# Patient Record
Sex: FEMALE | Race: WHITE | Employment: UNEMPLOYED | URBAN - METROPOLITAN AREA
[De-identification: names, ages, dates, MRNs, and addresses within clinical notes are randomized per-mention and may not be internally consistent; named-entity substitution may affect disease eponyms.]

---

## 2017-09-18 ENCOUNTER — TELEPHONE (OUTPATIENT)
Dept: FAMILY MEDICINE CLINIC | Facility: CLINIC | Age: 52
End: 2017-09-18

## 2017-09-18 DIAGNOSIS — Z12.31 ENCOUNTER FOR SCREENING MAMMOGRAM FOR BREAST CANCER: Primary | ICD-10-CM

## 2017-09-18 NOTE — TELEPHONE ENCOUNTER
Patient would like an order put in for a mammogram prior to her appt on 10/10/17 with Dr. Jarret Mccabe.

## 2017-09-27 ENCOUNTER — TELEPHONE (OUTPATIENT)
Dept: FAMILY MEDICINE CLINIC | Facility: CLINIC | Age: 52
End: 2017-09-27

## 2017-09-27 DIAGNOSIS — E55.9 VITAMIN D DEFICIENCY: ICD-10-CM

## 2017-09-27 DIAGNOSIS — Z00.00 LABORATORY EXAM ORDERED AS PART OF ROUTINE GENERAL MEDICAL EXAMINATION: Primary | ICD-10-CM

## 2017-09-27 DIAGNOSIS — E03.9 ACQUIRED HYPOTHYROIDISM: ICD-10-CM

## 2017-10-02 ENCOUNTER — HOSPITAL ENCOUNTER (OUTPATIENT)
Dept: MAMMOGRAPHY | Age: 52
Discharge: HOME | End: 2017-10-02
Attending: FAMILY MEDICINE
Payer: COMMERCIAL

## 2017-10-02 DIAGNOSIS — Z12.31 ENCOUNTER FOR SCREENING MAMMOGRAM FOR BREAST CANCER: ICD-10-CM

## 2017-10-02 PROCEDURE — 77067 SCR MAMMO BI INCL CAD: CPT | Performed by: FAMILY MEDICINE

## 2017-10-03 DIAGNOSIS — R92.8 ABNORMAL MAMMOGRAM OF LEFT BREAST: Primary | ICD-10-CM

## 2017-10-10 ENCOUNTER — OFFICE VISIT (OUTPATIENT)
Dept: FAMILY MEDICINE CLINIC | Facility: CLINIC | Age: 52
End: 2017-10-10

## 2017-10-10 VITALS
TEMPERATURE: 98 F | RESPIRATION RATE: 16 BRPM | HEART RATE: 60 BPM | HEIGHT: 66.8 IN | SYSTOLIC BLOOD PRESSURE: 100 MMHG | BODY MASS INDEX: 26.21 KG/M2 | WEIGHT: 167 LBS | DIASTOLIC BLOOD PRESSURE: 60 MMHG

## 2017-10-10 DIAGNOSIS — Z11.1 SCREENING EXAMINATION FOR PULMONARY TUBERCULOSIS: ICD-10-CM

## 2017-10-10 DIAGNOSIS — Z00.00 ROUTINE GENERAL MEDICAL EXAMINATION AT A HEALTH CARE FACILITY: Primary | ICD-10-CM

## 2017-10-10 PROCEDURE — 90471 IMMUNIZATION ADMIN: CPT | Performed by: FAMILY MEDICINE

## 2017-10-10 PROCEDURE — 86580 TB INTRADERMAL TEST: CPT | Performed by: FAMILY MEDICINE

## 2017-10-10 PROCEDURE — 90686 IIV4 VACC NO PRSV 0.5 ML IM: CPT | Performed by: FAMILY MEDICINE

## 2017-10-10 PROCEDURE — 99396 PREV VISIT EST AGE 40-64: CPT | Performed by: FAMILY MEDICINE

## 2017-10-10 RX ORDER — LEVOTHYROXINE SODIUM 100 UG/1
100 TABLET ORAL
Refills: 2 | COMMUNITY
Start: 2017-10-07 | End: 2020-07-31 | Stop reason: DRUGHIGH

## 2017-10-10 RX ORDER — POLYETHYLENE GLYCOL 3350, SODIUM CHLORIDE, POTASSIUM CHLORIDE, SODIUM BICARBONATE, AND SODIUM SULFATE 240; 5.84; 2.98; 6.72; 22.72 G/4L; G/4L; G/4L; G/4L; G/4L
POWDER, FOR SOLUTION ORAL
Refills: 0 | COMMUNITY
Start: 2017-08-25 | End: 2018-09-17 | Stop reason: ALTCHOICE

## 2017-10-10 NOTE — PROGRESS NOTES
HPI:   Lizzie Sullivan is a 50year old female who presents for a complete physical exam.  Last pap:  UTD per pt, seeing GYN  Last mammogram:  Having additional images next week  LMP:  Periods regular  Previous colonoscopy:  Scheduled in Dec  Previous Alcohol use: Yes           0.0 oz/week     Comment: 3 drinks a month    : yes. Children: son and daughter.      REVIEW OF SYSTEMS:   GENERAL: feels well otherwise  SKIN: denies any unusual skin lesions  EYES:no vision problems  LUNGS: denies shortn encounter     Imaging & Consults:  FLULAVAL INFLUENZA VACCINE QUAD PRESERVATIVE FREE 0.5 ML

## 2017-10-12 ENCOUNTER — NURSE ONLY (OUTPATIENT)
Dept: FAMILY MEDICINE CLINIC | Facility: CLINIC | Age: 52
End: 2017-10-12

## 2017-10-12 NOTE — PROGRESS NOTES
Pt came in for tb read. Pt here within testing protocol of 48-72 hours. No redness or swelling at test sit. Induration measured 0.0 mm.  NO induration, pt negative for tb

## 2017-10-17 ENCOUNTER — HOSPITAL ENCOUNTER (OUTPATIENT)
Dept: MAMMOGRAPHY | Facility: HOSPITAL | Age: 52
Discharge: HOME | End: 2017-10-17
Attending: FAMILY MEDICINE
Payer: COMMERCIAL

## 2017-10-17 DIAGNOSIS — R92.8 ABNORMAL MAMMOGRAM OF LEFT BREAST: ICD-10-CM

## 2017-10-17 PROCEDURE — 76642 ULTRASOUND BREAST LIMITED: CPT | Performed by: FAMILY MEDICINE

## 2017-10-17 PROCEDURE — 77065 DX MAMMO INCL CAD UNI: CPT | Performed by: FAMILY MEDICINE

## 2018-04-20 ENCOUNTER — TELEPHONE (OUTPATIENT)
Dept: FAMILY MEDICINE CLINIC | Facility: CLINIC | Age: 53
End: 2018-04-20

## 2018-04-20 DIAGNOSIS — R92.8 ABNORMAL MAMMOGRAM OF LEFT BREAST: Primary | ICD-10-CM

## 2018-04-20 NOTE — TELEPHONE ENCOUNTER
LOV 10/10/17. No future appointments. Last mammogram was done 10/17/17 and at that time, pt was advised to get follow up left diagnostic mammogram in 6 months-order pended. Routed to Dr Gt Saeed.

## 2018-05-04 ENCOUNTER — HOSPITAL ENCOUNTER (OUTPATIENT)
Dept: MAMMOGRAPHY | Facility: HOSPITAL | Age: 53
Discharge: HOME | End: 2018-05-04
Attending: FAMILY MEDICINE
Payer: COMMERCIAL

## 2018-05-04 DIAGNOSIS — R92.8 ABNORMAL MAMMOGRAM OF LEFT BREAST: ICD-10-CM

## 2018-05-04 PROCEDURE — 77065 DX MAMMO INCL CAD UNI: CPT | Performed by: FAMILY MEDICINE

## 2018-05-04 PROCEDURE — 76642 ULTRASOUND BREAST LIMITED: CPT | Performed by: FAMILY MEDICINE

## 2018-05-04 PROCEDURE — 77061 BREAST TOMOSYNTHESIS UNI: CPT | Performed by: FAMILY MEDICINE

## 2018-05-07 ENCOUNTER — TELEPHONE (OUTPATIENT)
Dept: FAMILY MEDICINE CLINIC | Facility: CLINIC | Age: 53
End: 2018-05-07

## 2018-05-07 DIAGNOSIS — R92.8 ABNORMAL MAMMOGRAM: Primary | ICD-10-CM

## 2018-05-07 NOTE — TELEPHONE ENCOUNTER
Notes recorded by Jaylen Santiago MD on 5/4/2018 at 4:37 PM CDT  Mammogram appears benign, however, bilateral six-month diagnostic mammogram is recommended.  Diagnosis abnormal mammogram.  Please order      Notes recorded by Isaiah Sanders RN on 5/7/2018 a

## 2018-09-17 ENCOUNTER — OFFICE VISIT (OUTPATIENT)
Dept: FAMILY MEDICINE CLINIC | Facility: CLINIC | Age: 53
End: 2018-09-17
Payer: COMMERCIAL

## 2018-09-17 VITALS
WEIGHT: 163 LBS | TEMPERATURE: 98 F | SYSTOLIC BLOOD PRESSURE: 100 MMHG | DIASTOLIC BLOOD PRESSURE: 60 MMHG | HEIGHT: 67.25 IN | HEART RATE: 60 BPM | RESPIRATION RATE: 12 BRPM | BODY MASS INDEX: 25.29 KG/M2

## 2018-09-17 DIAGNOSIS — T63.481A INSECT STINGS, ACCIDENTAL OR UNINTENTIONAL, INITIAL ENCOUNTER: Primary | ICD-10-CM

## 2018-09-17 PROCEDURE — 99213 OFFICE O/P EST LOW 20 MIN: CPT | Performed by: NURSE PRACTITIONER

## 2018-09-17 RX ORDER — PREDNISONE 20 MG/1
20 TABLET ORAL DAILY
Qty: 4 TABLET | Refills: 0 | Status: SHIPPED | OUTPATIENT
Start: 2018-09-17 | End: 2018-09-21

## 2018-09-17 NOTE — PATIENT INSTRUCTIONS
Take prednisone as ordered     Take over the counter allergy medicine such as Claritin, Allegra, Xyzal, Zytrec (or generic equivalent). Daily for 1 week. Apply warm compresses to area 2-3 times daily for 10-15 minutes.      If you

## 2018-10-24 ENCOUNTER — TELEPHONE (OUTPATIENT)
Dept: FAMILY MEDICINE CLINIC | Facility: CLINIC | Age: 53
End: 2018-10-24

## 2018-10-24 NOTE — TELEPHONE ENCOUNTER
Pt calling she is suppose to get her mammogram every 6 months and she is due and  she needs a order put in for one.

## 2018-11-02 ENCOUNTER — TELEPHONE (OUTPATIENT)
Dept: FAMILY MEDICINE CLINIC | Facility: CLINIC | Age: 53
End: 2018-11-02

## 2018-11-02 DIAGNOSIS — R92.8 ABNORMAL MAMMOGRAM OF BOTH BREASTS: Primary | ICD-10-CM

## 2018-11-02 NOTE — TELEPHONE ENCOUNTER
Becca Locks with [facility] calling to get an order for a bilateral ultrasound.  Patient is scheduled next week for mammogram

## 2018-11-06 ENCOUNTER — HOSPITAL ENCOUNTER (OUTPATIENT)
Dept: MAMMOGRAPHY | Facility: HOSPITAL | Age: 53
Discharge: HOME | End: 2018-11-06
Attending: FAMILY MEDICINE
Payer: COMMERCIAL

## 2018-11-06 DIAGNOSIS — R92.8 ABNORMAL MAMMOGRAM: ICD-10-CM

## 2018-11-06 PROCEDURE — 77066 DX MAMMO INCL CAD BI: CPT | Performed by: FAMILY MEDICINE

## 2018-11-06 PROCEDURE — 77062 BREAST TOMOSYNTHESIS BI: CPT | Performed by: FAMILY MEDICINE

## 2019-10-01 ENCOUNTER — TELEPHONE (OUTPATIENT)
Dept: FAMILY MEDICINE CLINIC | Facility: CLINIC | Age: 54
End: 2019-10-01

## 2019-10-01 DIAGNOSIS — Z12.31 VISIT FOR SCREENING MAMMOGRAM: Primary | ICD-10-CM

## 2019-10-01 DIAGNOSIS — E55.9 VITAMIN D DEFICIENCY: ICD-10-CM

## 2019-10-01 DIAGNOSIS — Z01.419 WELL WOMAN EXAM: ICD-10-CM

## 2019-10-01 DIAGNOSIS — E03.9 ACQUIRED HYPOTHYROIDISM: ICD-10-CM

## 2019-10-01 NOTE — TELEPHONE ENCOUNTER
Please enter lab orders for the patient's upcoming physical appointment. Please order mammogram and route back to . Physical scheduled:    Your appointments     Date & Time Appointment Department [facility])    Oct 08, 2019  2:30 PM CDT Physical

## 2019-10-08 ENCOUNTER — OFFICE VISIT (OUTPATIENT)
Dept: FAMILY MEDICINE CLINIC | Facility: CLINIC | Age: 54
End: 2019-10-08
Payer: COMMERCIAL

## 2019-10-08 VITALS
BODY MASS INDEX: 24.64 KG/M2 | RESPIRATION RATE: 16 BRPM | HEIGHT: 67 IN | HEART RATE: 60 BPM | WEIGHT: 157 LBS | DIASTOLIC BLOOD PRESSURE: 70 MMHG | TEMPERATURE: 98 F | SYSTOLIC BLOOD PRESSURE: 120 MMHG

## 2019-10-08 DIAGNOSIS — Z01.419 WELL WOMAN EXAM WITH ROUTINE GYNECOLOGICAL EXAM: Primary | ICD-10-CM

## 2019-10-08 DIAGNOSIS — Z12.4 SCREENING FOR CERVICAL CANCER: ICD-10-CM

## 2019-10-08 PROCEDURE — 99396 PREV VISIT EST AGE 40-64: CPT | Performed by: FAMILY MEDICINE

## 2019-10-08 PROCEDURE — 90686 IIV4 VACC NO PRSV 0.5 ML IM: CPT | Performed by: FAMILY MEDICINE

## 2019-10-08 PROCEDURE — 90471 IMMUNIZATION ADMIN: CPT | Performed by: FAMILY MEDICINE

## 2019-10-08 NOTE — PROGRESS NOTES
SUBJECTIVE:  Patient presents with:  Physical: WWE w pap  Imm/Inj: Flu Shot    HPI:  No concerns  Sees endocrinology for management of thyroid. Health Maintenance:  Vaccines: reviewed as below.  Indicated today: Influenza, shingrix    Immunization Histor Smokeless tobacco: Never Used    Alcohol use:  Yes      Alcohol/week: 0.0 standard drinks      Comment: 3 drinks a month    Drug use: No       Allergies:  No Known Allergies    OBJECTIVE:  PHYSICAL EXAM:   10/08/19  1415   BP: 120/70   Pulse: 60   Resp: 16 PRESERVATIVE FREE 0.5 ML  Obesity screening: Body mass index is 24.59 kg/m².  Discussed healthy diet and exercise in detail today  Hypertension screening: normotensive  Depression screening: negative  Cervical Cancer Screening:   -     THINPREP TIS AND HPV

## 2019-10-14 ENCOUNTER — TELEPHONE (OUTPATIENT)
Dept: FAMILY MEDICINE CLINIC | Facility: CLINIC | Age: 54
End: 2019-10-14

## 2019-11-12 ENCOUNTER — HOSPITAL ENCOUNTER (OUTPATIENT)
Dept: MAMMOGRAPHY | Facility: HOSPITAL | Age: 54
Discharge: HOME | End: 2019-11-12
Attending: FAMILY MEDICINE
Payer: COMMERCIAL

## 2019-11-12 DIAGNOSIS — Z12.31 VISIT FOR SCREENING MAMMOGRAM: ICD-10-CM

## 2019-11-12 PROCEDURE — 77063 BREAST TOMOSYNTHESIS BI: CPT | Performed by: FAMILY MEDICINE

## 2019-11-12 PROCEDURE — 77067 SCR MAMMO BI INCL CAD: CPT | Performed by: FAMILY MEDICINE

## 2019-11-25 ENCOUNTER — HOSPITAL ENCOUNTER (OUTPATIENT)
Age: 54
Discharge: HOME | End: 2019-11-25
Attending: FAMILY MEDICINE
Payer: COMMERCIAL

## 2019-11-25 VITALS
HEART RATE: 84 BPM | BODY MASS INDEX: 23.54 KG/M2 | SYSTOLIC BLOOD PRESSURE: 96 MMHG | DIASTOLIC BLOOD PRESSURE: 74 MMHG | OXYGEN SATURATION: 100 % | RESPIRATION RATE: 18 BRPM | TEMPERATURE: 98 F | WEIGHT: 150 LBS | HEIGHT: 67 IN

## 2019-11-25 DIAGNOSIS — J02.9 ACUTE PHARYNGITIS, UNSPECIFIED ETIOLOGY: Primary | ICD-10-CM

## 2019-11-25 PROCEDURE — 99214 OFFICE O/P EST MOD 30 MIN: CPT

## 2019-11-25 PROCEDURE — 87430 STREP A AG IA: CPT | Performed by: FAMILY MEDICINE

## 2019-11-25 PROCEDURE — 87430 STREP A AG IA: CPT

## 2019-11-25 PROCEDURE — 87081 CULTURE SCREEN ONLY: CPT | Performed by: FAMILY MEDICINE

## 2019-11-25 PROCEDURE — 99204 OFFICE O/P NEW MOD 45 MIN: CPT

## 2019-11-25 RX ORDER — PREDNISONE 20 MG/1
20 TABLET ORAL 2 TIMES DAILY
Qty: 10 TABLET | Refills: 0 | Status: SHIPPED | OUTPATIENT
Start: 2019-11-25 | End: 2019-11-30

## 2019-11-26 NOTE — ED PROVIDER NOTES
Patient presents with:  Sore Throat      HPI:     Maryam Rivera is a 48year old female who presents for evaluation of a chief complaint of sore throat. Associated symptoms include chills . Onset of symptoms was abrupt starting 3 days ago.  Symptoms h If has worsening symptoms, drooling from mouth and unable to swallow go to ER         All results reviewed and discussed with patient. See AVS for detailed discharge instructions for your condition today.     Follow Up with:  Humberto Sheppard MD  [address] [facility]

## 2020-02-28 ENCOUNTER — TELEPHONE (OUTPATIENT)
Dept: FAMILY MEDICINE CLINIC | Facility: CLINIC | Age: 55
End: 2020-02-28

## 2020-03-24 ENCOUNTER — TELEPHONE (OUTPATIENT)
Dept: FAMILY MEDICINE CLINIC | Facility: CLINIC | Age: 55
End: 2020-03-24

## 2020-03-24 DIAGNOSIS — H10.31 ACUTE CONJUNCTIVITIS OF RIGHT EYE, UNSPECIFIED ACUTE CONJUNCTIVITIS TYPE: Primary | ICD-10-CM

## 2020-03-24 PROCEDURE — 99441 PHONE E/M BY PHYS 5-10 MIN: CPT | Performed by: FAMILY MEDICINE

## 2020-03-24 RX ORDER — POLYMYXIN B SULFATE AND TRIMETHOPRIM 1; 10000 MG/ML; [USP'U]/ML
2 SOLUTION OPHTHALMIC EVERY 4 HOURS
Qty: 10 ML | Refills: 1 | Status: SHIPPED | OUTPATIENT
Start: 2020-03-24

## 2020-03-24 NOTE — TELEPHONE ENCOUNTER
Virtual/Telephone Check-In    Anika Flores verbally consents to a [employer] on 3/24/2020.   Patient understands and accepts financial responsibility for any deductible, co-insurance and/or co-pays associated with this service

## 2020-03-24 NOTE — TELEPHONE ENCOUNTER
C/o eye trouble for several days some discharge but feels burning feeling to right eye some redness, no other symptoms

## 2020-03-24 NOTE — TELEPHONE ENCOUNTER
Right eye has discharge itching x 1 days  No pain  No vision change  Crusting in AM  No contacts  No cough  No fever    1. Acute conjunctivitis of right eye, unspecified acute conjunctivitis type  abx drops as directed.   Contagious, dont touch eyes, wash h

## 2020-07-30 ENCOUNTER — TELEPHONE (OUTPATIENT)
Dept: FAMILY MEDICINE CLINIC | Facility: CLINIC | Age: 55
End: 2020-07-30

## 2020-07-30 DIAGNOSIS — Z23 NEED FOR SHINGLES VACCINE: Primary | ICD-10-CM

## 2020-07-30 NOTE — TELEPHONE ENCOUNTER
Pt is scheduled for 1st shingrix 8/7 please place orders. Pt aware she's due for her physical in October and will schedule later with Dr. Kelsey Guy.

## 2020-07-31 RX ORDER — LEVOTHYROXINE SODIUM 112 UG/1
TABLET ORAL
COMMUNITY
Start: 2020-07-13 | End: 2020-12-16 | Stop reason: DRUGHIGH

## 2020-08-07 ENCOUNTER — NURSE ONLY (OUTPATIENT)
Dept: FAMILY MEDICINE CLINIC | Facility: CLINIC | Age: 55
End: 2020-08-07
Payer: COMMERCIAL

## 2020-08-07 VITALS — TEMPERATURE: 98 F

## 2020-08-07 DIAGNOSIS — Z23 NEED FOR SHINGLES VACCINE: ICD-10-CM

## 2020-08-07 PROCEDURE — 90750 HZV VACC RECOMBINANT IM: CPT | Performed by: FAMILY MEDICINE

## 2020-08-07 PROCEDURE — 90471 IMMUNIZATION ADMIN: CPT | Performed by: FAMILY MEDICINE

## 2020-08-07 NOTE — PROGRESS NOTES
Patient presents for her shingles vaccine done one. The Shingrix was ordered by Dr. Nico Camejo M.D. The patient is given the VIS and has signed the waiver. The Shingrix is administered into the left deltoid.     The patient is reminded to return in two to s

## 2020-11-04 ENCOUNTER — TELEPHONE (OUTPATIENT)
Dept: FAMILY MEDICINE CLINIC | Facility: CLINIC | Age: 55
End: 2020-11-04

## 2020-11-04 DIAGNOSIS — Z12.31 BREAST CANCER SCREENING BY MAMMOGRAM: Primary | ICD-10-CM

## 2020-11-04 DIAGNOSIS — Z23 NEED FOR SHINGLES VACCINE: Primary | ICD-10-CM

## 2020-11-05 NOTE — TELEPHONE ENCOUNTER
Shigrix pending for Nurse visit for tomorrow  Forward to Dr Kwabena Winters who complete physical on 10/14/2019

## 2020-11-06 ENCOUNTER — NURSE ONLY (OUTPATIENT)
Dept: FAMILY MEDICINE CLINIC | Facility: CLINIC | Age: 55
End: 2020-11-06
Payer: COMMERCIAL

## 2020-11-06 VITALS — TEMPERATURE: 98 F

## 2020-11-06 DIAGNOSIS — Z23 NEED FOR SHINGLES VACCINE: ICD-10-CM

## 2020-11-06 PROCEDURE — 90750 HZV VACC RECOMBINANT IM: CPT | Performed by: FAMILY MEDICINE

## 2020-11-06 PROCEDURE — 90471 IMMUNIZATION ADMIN: CPT | Performed by: FAMILY MEDICINE

## 2020-11-06 NOTE — PROGRESS NOTES
Patient presents for her second dose of Shingrix. She has no complaints. She is given the VIS   She has signed the waiver   The Shingrix was ordered by Dr. Virginia Piña M.D. The Shingrix is administered into the left deltoid.    After sitting for a few minute

## 2020-12-10 PROBLEM — E06.3 HASHIMOTO'S THYROIDITIS: Status: ACTIVE | Noted: 2020-12-10

## 2021-04-27 ENCOUNTER — HOSPITAL ENCOUNTER (OUTPATIENT)
Dept: MAMMOGRAPHY | Facility: HOSPITAL | Age: 56
Discharge: HOME | End: 2021-04-27
Attending: FAMILY MEDICINE
Payer: COMMERCIAL

## 2021-04-27 DIAGNOSIS — Z12.31 BREAST CANCER SCREENING BY MAMMOGRAM: ICD-10-CM

## 2021-04-27 PROCEDURE — 77067 SCR MAMMO BI INCL CAD: CPT | Performed by: FAMILY MEDICINE

## 2021-04-27 PROCEDURE — 77063 BREAST TOMOSYNTHESIS BI: CPT | Performed by: FAMILY MEDICINE

## 2021-04-29 ENCOUNTER — HOSPITAL ENCOUNTER (OUTPATIENT)
Dept: MAMMOGRAPHY | Facility: HOSPITAL | Age: 56
Discharge: HOME | End: 2021-04-29
Attending: FAMILY MEDICINE
Payer: COMMERCIAL

## 2021-04-29 DIAGNOSIS — R92.2 INCONCLUSIVE MAMMOGRAM: ICD-10-CM

## 2021-04-29 PROCEDURE — 77065 DX MAMMO INCL CAD UNI: CPT | Performed by: FAMILY MEDICINE

## 2021-04-29 PROCEDURE — 77061 BREAST TOMOSYNTHESIS UNI: CPT | Performed by: FAMILY MEDICINE

## 2021-12-07 PROBLEM — G57.61 MORTON METATARSALGIA, RIGHT: Status: ACTIVE | Noted: 2021-12-07

## 2022-01-26 ENCOUNTER — OFFICE VISIT (OUTPATIENT)
Dept: FAMILY MEDICINE CLINIC | Facility: CLINIC | Age: 57
End: 2022-01-26
Payer: COMMERCIAL

## 2022-01-26 VITALS
SYSTOLIC BLOOD PRESSURE: 122 MMHG | RESPIRATION RATE: 16 BRPM | DIASTOLIC BLOOD PRESSURE: 68 MMHG | OXYGEN SATURATION: 99 % | TEMPERATURE: 97 F | BODY MASS INDEX: 25.11 KG/M2 | HEIGHT: 67 IN | WEIGHT: 160 LBS | HEART RATE: 96 BPM

## 2022-01-26 DIAGNOSIS — R30.0 DYSURIA: Primary | ICD-10-CM

## 2022-01-26 LAB
BILIRUBIN: NEGATIVE
GLUCOSE (URINE DIPSTICK): NEGATIVE MG/DL
KETONES (URINE DIPSTICK): NEGATIVE MG/DL
MULTISTIX LOT#: ABNORMAL NUMERIC
NITRITE, URINE: NEGATIVE
PH, URINE: 5.5 (ref 4.5–8)
PROTEIN (URINE DIPSTICK): NEGATIVE MG/DL
SPECIFIC GRAVITY: 1.03 (ref 1–1.03)
URINE-COLOR: YELLOW
UROBILINOGEN,SEMI-QN: 0.2 MG/DL (ref 0–1.9)

## 2022-01-26 PROCEDURE — 3078F DIAST BP <80 MM HG: CPT | Performed by: NURSE PRACTITIONER

## 2022-01-26 PROCEDURE — 99213 OFFICE O/P EST LOW 20 MIN: CPT | Performed by: NURSE PRACTITIONER

## 2022-01-26 PROCEDURE — 81003 URINALYSIS AUTO W/O SCOPE: CPT | Performed by: NURSE PRACTITIONER

## 2022-01-26 PROCEDURE — 3074F SYST BP LT 130 MM HG: CPT | Performed by: NURSE PRACTITIONER

## 2022-01-26 PROCEDURE — 3008F BODY MASS INDEX DOCD: CPT | Performed by: NURSE PRACTITIONER

## 2022-01-26 RX ORDER — NITROFURANTOIN 25; 75 MG/1; MG/1
100 CAPSULE ORAL 2 TIMES DAILY
Qty: 14 CAPSULE | Refills: 0 | Status: SHIPPED | OUTPATIENT
Start: 2022-01-26

## 2022-01-26 NOTE — PROGRESS NOTES
Patient presents with:  Urinary: bladdder infection for about a week       HPI:  Presents with approx 1 week history of dysuria, waxing and waning in intensity. Denies fever/chills, urgency, frequency, hematuria, abd/flank pain, N/V/D.  Has not been treatin culture and start treatment with nitrofurantoin today. Medication administration, use and side effects discussed. Instructed to notify office if not improved in 3-4 days or if symptoms worsen.  Verbalized understanding of instructions and agreeable to this

## 2022-01-31 ENCOUNTER — TELEPHONE (OUTPATIENT)
Dept: FAMILY MEDICINE CLINIC | Facility: CLINIC | Age: 57
End: 2022-01-31

## 2022-01-31 NOTE — TELEPHONE ENCOUNTER
Patient states she received her urine results last week. Urine negative for infection. This morning when urinating there was a \"stone looking thing\" when she wiped. Looked like \"half a kidney stone\".    Yesterday and this morning prior to urinating sh

## 2022-02-08 ENCOUNTER — TELEPHONE (OUTPATIENT)
Dept: FAMILY MEDICINE CLINIC | Facility: CLINIC | Age: 57
End: 2022-02-08

## 2022-05-10 NOTE — TELEPHONE ENCOUNTER
Called and spoke with patient. Relayed provider recommendation, per Vanessa Chicas CNP: I think she should be seen this week for another assessment.  Are their same day appointments available this week?     Patient requesting appointment on Thursday if possible. Scheduled for 5/12/22 OV w/ Carlee Chris CNP. Advised if any new/worsening symptoms before that time, to contact clinic. Patient agreed with plan.     Avi BETTENCOURT RN       LOV physical completed by Dr Emily Reyes who is out of the office for an extended time  Physical completed prior was with Dr Marisel Bullock (PCP)

## 2022-06-20 ENCOUNTER — TELEPHONE (OUTPATIENT)
Dept: FAMILY MEDICINE CLINIC | Facility: CLINIC | Age: 57
End: 2022-06-20

## 2022-06-20 DIAGNOSIS — Z12.31 ENCOUNTER FOR SCREENING MAMMOGRAM FOR BREAST CANCER: Primary | ICD-10-CM

## 2022-07-01 ENCOUNTER — HOSPITAL ENCOUNTER (EMERGENCY)
Facility: HOSPITAL | Age: 57
Discharge: HOME | End: 2022-07-01
Attending: EMERGENCY MEDICINE
Payer: COMMERCIAL

## 2022-07-01 ENCOUNTER — APPOINTMENT (OUTPATIENT)
Dept: GENERAL RADIOLOGY | Facility: HOSPITAL | Age: 57
End: 2022-07-01
Payer: COMMERCIAL

## 2022-07-01 VITALS
OXYGEN SATURATION: 98 % | RESPIRATION RATE: 18 BRPM | WEIGHT: 158.75 LBS | HEART RATE: 74 BPM | BODY MASS INDEX: 25 KG/M2 | TEMPERATURE: 98 F | SYSTOLIC BLOOD PRESSURE: 124 MMHG | DIASTOLIC BLOOD PRESSURE: 60 MMHG

## 2022-07-01 DIAGNOSIS — R07.9 CHEST PAIN OF UNCERTAIN ETIOLOGY: Primary | ICD-10-CM

## 2022-07-01 LAB
ALBUMIN SERPL-MCNC: 3.5 G/DL (ref 3.4–5)
ALBUMIN/GLOB SERPL: 1 {RATIO} (ref 1–2)
ALP LIVER SERPL-CCNC: 77 U/L
ALT SERPL-CCNC: 18 U/L
ANION GAP SERPL CALC-SCNC: 2 MMOL/L (ref 0–18)
AST SERPL-CCNC: 20 U/L (ref 15–37)
ATRIAL RATE: 69 BPM
BASOPHILS # BLD AUTO: 0.02 X10(3) UL (ref 0–0.2)
BASOPHILS NFR BLD AUTO: 0.4 %
BILIRUB SERPL-MCNC: 0.4 MG/DL (ref 0.1–2)
BUN BLD-MCNC: 13 MG/DL (ref 7–18)
CALCIUM BLD-MCNC: 8.9 MG/DL (ref 8.5–10.1)
CHLORIDE SERPL-SCNC: 110 MMOL/L (ref 98–112)
CO2 SERPL-SCNC: 28 MMOL/L (ref 21–32)
CREAT BLD-MCNC: 0.82 MG/DL
D DIMER PPP FEU-MCNC: 0.36 UG/ML FEU (ref ?–0.55)
EOSINOPHIL # BLD AUTO: 0.06 X10(3) UL (ref 0–0.7)
EOSINOPHIL NFR BLD AUTO: 1.1 %
ERYTHROCYTE [DISTWIDTH] IN BLOOD BY AUTOMATED COUNT: 13.3 %
GLOBULIN PLAS-MCNC: 3.5 G/DL (ref 2.8–4.4)
GLUCOSE BLD-MCNC: 82 MG/DL (ref 70–99)
HCT VFR BLD AUTO: 38.3 %
HGB BLD-MCNC: 12.8 G/DL
IMM GRANULOCYTES # BLD AUTO: 0.02 X10(3) UL (ref 0–1)
IMM GRANULOCYTES NFR BLD: 0.4 %
LYMPHOCYTES # BLD AUTO: 1.55 X10(3) UL (ref 1–4)
LYMPHOCYTES NFR BLD AUTO: 28.8 %
MCH RBC QN AUTO: 30.3 PG (ref 26–34)
MCHC RBC AUTO-ENTMCNC: 33.4 G/DL (ref 31–37)
MCV RBC AUTO: 90.5 FL
MONOCYTES # BLD AUTO: 0.32 X10(3) UL (ref 0.1–1)
MONOCYTES NFR BLD AUTO: 5.9 %
NEUTROPHILS # BLD AUTO: 3.41 X10 (3) UL (ref 1.5–7.7)
NEUTROPHILS # BLD AUTO: 3.41 X10(3) UL (ref 1.5–7.7)
NEUTROPHILS NFR BLD AUTO: 63.4 %
OSMOLALITY SERPL CALC.SUM OF ELEC: 289 MOSM/KG (ref 275–295)
P AXIS: 53 DEGREES
P-R INTERVAL: 140 MS
PLATELET # BLD AUTO: 221 10(3)UL (ref 150–450)
POTASSIUM SERPL-SCNC: 4 MMOL/L (ref 3.5–5.1)
PROT SERPL-MCNC: 7 G/DL (ref 6.4–8.2)
Q-T INTERVAL: 408 MS
QRS DURATION: 94 MS
QTC CALCULATION (BEZET): 437 MS
R AXIS: 41 DEGREES
RBC # BLD AUTO: 4.23 X10(6)UL
SODIUM SERPL-SCNC: 140 MMOL/L (ref 136–145)
T AXIS: 40 DEGREES
T4 FREE SERPL-MCNC: 1 NG/DL (ref 0.8–1.7)
TROPONIN I HIGH SENSITIVITY: 10 NG/L
VENTRICULAR RATE: 69 BPM
WBC # BLD AUTO: 5.4 X10(3) UL (ref 4–11)

## 2022-07-01 PROCEDURE — 85379 FIBRIN DEGRADATION QUANT: CPT | Performed by: EMERGENCY MEDICINE

## 2022-07-01 PROCEDURE — 93010 ELECTROCARDIOGRAM REPORT: CPT

## 2022-07-01 PROCEDURE — 36415 COLL VENOUS BLD VENIPUNCTURE: CPT

## 2022-07-01 PROCEDURE — 84439 ASSAY OF FREE THYROXINE: CPT | Performed by: EMERGENCY MEDICINE

## 2022-07-01 PROCEDURE — 99284 EMERGENCY DEPT VISIT MOD MDM: CPT

## 2022-07-01 PROCEDURE — 84484 ASSAY OF TROPONIN QUANT: CPT | Performed by: EMERGENCY MEDICINE

## 2022-07-01 PROCEDURE — 80053 COMPREHEN METABOLIC PANEL: CPT | Performed by: EMERGENCY MEDICINE

## 2022-07-01 PROCEDURE — 85025 COMPLETE CBC W/AUTO DIFF WBC: CPT | Performed by: EMERGENCY MEDICINE

## 2022-07-01 PROCEDURE — 71045 X-RAY EXAM CHEST 1 VIEW: CPT | Performed by: EMERGENCY MEDICINE

## 2022-07-01 PROCEDURE — 93005 ELECTROCARDIOGRAM TRACING: CPT

## 2022-07-01 NOTE — ED INITIAL ASSESSMENT (HPI)
Pt to the emergency room with chest pain since 0930 this morning. No sob, no dizziness no weakness. Pt states that while at work she noted sudden sharp pains similar to a jolt that would happen occasionally. Pt denies previous hx of this. No previous cardiac or respiratory hx. No medications taken pta. NO other concern at this time. Pt does not appear to be in distress at this time.

## 2022-07-23 ENCOUNTER — HOSPITAL ENCOUNTER (OUTPATIENT)
Dept: MAMMOGRAPHY | Age: 57
Discharge: HOME | End: 2022-07-23
Attending: FAMILY MEDICINE
Payer: COMMERCIAL

## 2022-07-23 DIAGNOSIS — Z12.31 ENCOUNTER FOR SCREENING MAMMOGRAM FOR BREAST CANCER: ICD-10-CM

## 2022-07-23 PROCEDURE — 77063 BREAST TOMOSYNTHESIS BI: CPT | Performed by: FAMILY MEDICINE

## 2022-07-23 PROCEDURE — 77067 SCR MAMMO BI INCL CAD: CPT | Performed by: FAMILY MEDICINE

## 2022-09-29 ENCOUNTER — TELEPHONE (OUTPATIENT)
Dept: FAMILY MEDICINE CLINIC | Facility: CLINIC | Age: 57
End: 2022-09-29

## 2022-09-29 NOTE — TELEPHONE ENCOUNTER
Last mammogram 7/23/2022    Routed to Dr. Viri Bourgeois - any need for breast ultrasound d/t dense breasts?

## 2022-09-29 NOTE — TELEPHONE ENCOUNTER
TC from patient who has been hearing a lot lately on the news about dense breasts and need for ultrasound. Patient's last ultrasound once again stated dense breast tissue and wondering if she should get a breast ultrasound.    Patient can be reached at [phone]

## 2022-09-30 ENCOUNTER — TELEPHONE (OUTPATIENT)
Dept: FAMILY MEDICINE CLINIC | Facility: CLINIC | Age: 57
End: 2022-09-30

## 2022-09-30 DIAGNOSIS — Z00.00 LABORATORY EXAMINATION ORDERED AS PART OF A COMPLETE PHYSICAL EXAMINATION: Primary | ICD-10-CM

## 2022-09-30 NOTE — TELEPHONE ENCOUNTER
Please enter lab orders for the patient's upcoming physical appointment. Physical scheduled: Your appointments     Date & Time Appointment Department [facility])    Oct 25, 2022  1:45 PM CDT Physical - Established with Susanna Bloch, PA [facility], [address] [address], [place]  (Fani Olvera)            [facility] Alejo [address] [phone]         Preferred lab: [facility]     The patient has been notified to complete fasting labs prior to their physical appointment.

## 2022-09-30 NOTE — TELEPHONE ENCOUNTER
Patient notified. Patient verbalized understanding of information given. Call transferred for scheduling.

## 2022-10-04 ENCOUNTER — OFFICE VISIT (OUTPATIENT)
Dept: FAMILY MEDICINE CLINIC | Facility: CLINIC | Age: 57
End: 2022-10-04
Payer: COMMERCIAL

## 2022-10-04 VITALS
BODY MASS INDEX: 25.17 KG/M2 | HEIGHT: 66.75 IN | SYSTOLIC BLOOD PRESSURE: 104 MMHG | RESPIRATION RATE: 16 BRPM | WEIGHT: 160.38 LBS | HEART RATE: 64 BPM | TEMPERATURE: 98 F | DIASTOLIC BLOOD PRESSURE: 62 MMHG

## 2022-10-04 DIAGNOSIS — M94.0 COSTOCHONDRITIS: ICD-10-CM

## 2022-10-04 DIAGNOSIS — K21.9 GASTROESOPHAGEAL REFLUX DISEASE WITHOUT ESOPHAGITIS: ICD-10-CM

## 2022-10-04 DIAGNOSIS — M21.921 SHOULDER JOINT DEFORMITY, RIGHT: Primary | ICD-10-CM

## 2022-10-04 PROCEDURE — 3008F BODY MASS INDEX DOCD: CPT | Performed by: NURSE PRACTITIONER

## 2022-10-04 PROCEDURE — 3078F DIAST BP <80 MM HG: CPT | Performed by: NURSE PRACTITIONER

## 2022-10-04 PROCEDURE — 99214 OFFICE O/P EST MOD 30 MIN: CPT | Performed by: NURSE PRACTITIONER

## 2022-10-04 PROCEDURE — 3074F SYST BP LT 130 MM HG: CPT | Performed by: NURSE PRACTITIONER

## 2022-10-05 ENCOUNTER — HOSPITAL ENCOUNTER (OUTPATIENT)
Dept: GENERAL RADIOLOGY | Age: 57
Discharge: HOME | End: 2022-10-05
Attending: NURSE PRACTITIONER
Payer: COMMERCIAL

## 2022-10-05 DIAGNOSIS — M21.921 SHOULDER JOINT DEFORMITY, RIGHT: ICD-10-CM

## 2022-10-05 PROCEDURE — 73030 X-RAY EXAM OF SHOULDER: CPT | Performed by: NURSE PRACTITIONER

## 2022-10-11 ENCOUNTER — TELEPHONE (OUTPATIENT)
Dept: FAMILY MEDICINE CLINIC | Facility: CLINIC | Age: 57
End: 2022-10-11

## 2022-10-14 LAB
ABSOLUTE BASOPHILS: 19 CELLS/UL (ref 0–200)
ABSOLUTE EOSINOPHILS: 80 CELLS/UL (ref 15–500)
ABSOLUTE LYMPHOCYTES: 1725 CELLS/UL (ref 850–3900)
ABSOLUTE MONOCYTES: 251 CELLS/UL (ref 200–950)
ABSOLUTE NEUTROPHILS: 1725 CELLS/UL (ref 1500–7800)
ALBUMIN/GLOBULIN RATIO: 1.8 (CALC) (ref 1–2.5)
ALBUMIN: 4.3 G/DL (ref 3.6–5.1)
ALKALINE PHOSPHATASE: 65 U/L (ref 37–153)
ALT: 18 U/L (ref 6–29)
AST: 21 U/L (ref 10–35)
BASOPHILS: 0.5 %
BILIRUBIN, TOTAL: 0.6 MG/DL (ref 0.2–1.2)
BUN: 16 MG/DL (ref 7–25)
CALCIUM: 9.5 MG/DL (ref 8.6–10.4)
CARBON DIOXIDE: 29 MMOL/L (ref 20–32)
CHLORIDE: 106 MMOL/L (ref 98–110)
CHOL/HDLC RATIO: 2.7 (CALC)
CHOLESTEROL, TOTAL: 230 MG/DL
CREATININE: 0.81 MG/DL (ref 0.5–1.03)
EGFR: 86 ML/MIN/1.73M2
EOSINOPHILS: 2.1 %
GLOBULIN: 2.4 G/DL (CALC) (ref 1.9–3.7)
GLUCOSE: 89 MG/DL (ref 65–99)
HDL CHOLESTEROL: 86 MG/DL
HEMATOCRIT: 39.8 % (ref 35–45)
HEMOGLOBIN: 13.2 G/DL (ref 11.7–15.5)
LDL-CHOLESTEROL: 129 MG/DL (CALC)
LYMPHOCYTES: 45.4 %
MCH: 29.9 PG (ref 27–33)
MCHC: 33.2 G/DL (ref 32–36)
MCV: 90 FL (ref 80–100)
MONOCYTES: 6.6 %
MPV: 10.2 FL (ref 7.5–12.5)
NEUTROPHILS: 45.4 %
NON-HDL CHOLESTEROL: 144 MG/DL (CALC)
PLATELET COUNT: 238 THOUSAND/UL (ref 140–400)
POTASSIUM: 4.6 MMOL/L (ref 3.5–5.3)
PROTEIN, TOTAL: 6.7 G/DL (ref 6.1–8.1)
RDW: 12.8 % (ref 11–15)
RED BLOOD CELL COUNT: 4.42 MILLION/UL (ref 3.8–5.1)
SODIUM: 140 MMOL/L (ref 135–146)
TRIGLYCERIDES: 59 MG/DL
TSH W/REFLEX TO FT4: 0.95 MIU/L
WHITE BLOOD CELL COUNT: 3.8 THOUSAND/UL (ref 3.8–10.8)

## 2022-10-25 ENCOUNTER — OFFICE VISIT (OUTPATIENT)
Dept: FAMILY MEDICINE CLINIC | Facility: CLINIC | Age: 57
End: 2022-10-25
Payer: COMMERCIAL

## 2022-10-25 VITALS
WEIGHT: 160 LBS | HEIGHT: 66 IN | SYSTOLIC BLOOD PRESSURE: 126 MMHG | RESPIRATION RATE: 18 BRPM | BODY MASS INDEX: 25.71 KG/M2 | DIASTOLIC BLOOD PRESSURE: 80 MMHG | HEART RATE: 74 BPM

## 2022-10-25 DIAGNOSIS — Z00.00 WELL ADULT EXAM: Primary | ICD-10-CM

## 2022-10-25 DIAGNOSIS — Z12.4 SCREENING FOR CERVICAL CANCER: ICD-10-CM

## 2022-10-25 DIAGNOSIS — E03.9 ACQUIRED HYPOTHYROIDISM: ICD-10-CM

## 2022-10-25 PROCEDURE — 99396 PREV VISIT EST AGE 40-64: CPT | Performed by: PHYSICIAN ASSISTANT

## 2022-10-25 PROCEDURE — 3074F SYST BP LT 130 MM HG: CPT | Performed by: PHYSICIAN ASSISTANT

## 2022-10-25 PROCEDURE — 3079F DIAST BP 80-89 MM HG: CPT | Performed by: PHYSICIAN ASSISTANT

## 2022-10-25 PROCEDURE — 3008F BODY MASS INDEX DOCD: CPT | Performed by: PHYSICIAN ASSISTANT

## 2022-10-27 LAB — HPV MRNA E6/E7: NOT DETECTED

## 2022-11-20 ENCOUNTER — HOSPITAL ENCOUNTER (OUTPATIENT)
Age: 57
Discharge: HOME | End: 2022-11-20
Payer: COMMERCIAL

## 2022-11-20 VITALS
TEMPERATURE: 99 F | RESPIRATION RATE: 20 BRPM | DIASTOLIC BLOOD PRESSURE: 86 MMHG | HEART RATE: 82 BPM | SYSTOLIC BLOOD PRESSURE: 119 MMHG | OXYGEN SATURATION: 99 %

## 2022-11-20 DIAGNOSIS — J04.0 ACUTE LARYNGITIS: Primary | ICD-10-CM

## 2022-11-20 DIAGNOSIS — J20.8 ACUTE VIRAL BRONCHITIS: ICD-10-CM

## 2022-11-20 DIAGNOSIS — R05.9 COUGH: ICD-10-CM

## 2022-11-20 LAB
POCT INFLUENZA A: NEGATIVE
POCT INFLUENZA B: NEGATIVE
S PYO AG THROAT QL: NEGATIVE

## 2022-11-20 RX ORDER — BENZONATATE 100 MG/1
100 CAPSULE ORAL 3 TIMES DAILY PRN
Qty: 30 CAPSULE | Refills: 0 | Status: SHIPPED | OUTPATIENT
Start: 2022-11-20 | End: 2022-12-20

## 2022-11-20 RX ORDER — PREDNISONE 20 MG/1
40 TABLET ORAL DAILY
Qty: 10 TABLET | Refills: 0 | Status: SHIPPED | OUTPATIENT
Start: 2022-11-20 | End: 2022-11-25

## 2022-11-20 NOTE — ED INITIAL ASSESSMENT (HPI)
Pt c/o 1 week of intermittent sore throat. Pt also c/o cough and stuffy nose. Pt has had 3 negative home tests.

## 2023-07-07 ENCOUNTER — OFFICE VISIT (OUTPATIENT)
Dept: FAMILY MEDICINE CLINIC | Facility: CLINIC | Age: 58
End: 2023-07-07
Payer: COMMERCIAL

## 2023-07-07 VITALS
HEIGHT: 66 IN | OXYGEN SATURATION: 99 % | BODY MASS INDEX: 26.52 KG/M2 | HEART RATE: 74 BPM | DIASTOLIC BLOOD PRESSURE: 70 MMHG | WEIGHT: 165 LBS | SYSTOLIC BLOOD PRESSURE: 110 MMHG

## 2023-07-07 DIAGNOSIS — R51.9 ACUTE NONINTRACTABLE HEADACHE, UNSPECIFIED HEADACHE TYPE: Primary | ICD-10-CM

## 2023-07-07 DIAGNOSIS — R42 DIZZINESS: ICD-10-CM

## 2023-07-07 LAB
AMB EXT BUN: 14 MG/DL
AMB EXT CALCIUM: 9.3
AMB EXT CHLORIDE: 106
AMB EXT CREATININE: 0.83 MG/DL
AMB EXT GLUCOSE: 83 MG/DL
AMB EXT HEMATOCRIT: 37.9
AMB EXT HEMOGLOBIN: 12.8
AMB EXT MCV: 90
AMB EXT PLATELETS: 254
AMB EXT POSTASSIUM: 4.6 MMOL/L
AMB EXT TSH: 1.53 MIU/ML
AMB EXT WBC: 4 X10(3)UL

## 2023-07-07 PROCEDURE — 99213 OFFICE O/P EST LOW 20 MIN: CPT | Performed by: STUDENT IN AN ORGANIZED HEALTH CARE EDUCATION/TRAINING PROGRAM

## 2023-07-07 PROCEDURE — 3074F SYST BP LT 130 MM HG: CPT | Performed by: STUDENT IN AN ORGANIZED HEALTH CARE EDUCATION/TRAINING PROGRAM

## 2023-07-07 PROCEDURE — 3078F DIAST BP <80 MM HG: CPT | Performed by: STUDENT IN AN ORGANIZED HEALTH CARE EDUCATION/TRAINING PROGRAM

## 2023-07-07 PROCEDURE — 3008F BODY MASS INDEX DOCD: CPT | Performed by: STUDENT IN AN ORGANIZED HEALTH CARE EDUCATION/TRAINING PROGRAM

## 2023-07-07 RX ORDER — MELOXICAM 15 MG/1
TABLET ORAL
COMMUNITY
Start: 2023-05-03

## 2023-07-13 ENCOUNTER — TELEPHONE (OUTPATIENT)
Dept: FAMILY MEDICINE CLINIC | Facility: CLINIC | Age: 58
End: 2023-07-13

## 2023-07-13 NOTE — TELEPHONE ENCOUNTER
Called and talked to patient she had labs done at [facility] so results cam in paper on Dr Lopes Second desk I told the patient we would be in contact after the doctor has reviewed the results

## 2023-07-17 LAB
FERRITIN: 46
IRON: 115
TIBC: 344

## 2023-07-17 NOTE — TELEPHONE ENCOUNTER
Called Jessica Vincent giving her s comments regarding lab results. Jessica Vincent had no further questions and verbalized understanding.

## 2023-07-17 NOTE — TELEPHONE ENCOUNTER
Thyroid, blood counts, metabolic panel including electrolytes and kidney function, and iron were all in normal range. Please let me know if she has any specific questions.  Thank youj

## 2023-07-25 ENCOUNTER — HOSPITAL ENCOUNTER (OUTPATIENT)
Dept: MAMMOGRAPHY | Age: 58
Discharge: HOME | End: 2023-07-25
Attending: PHYSICIAN ASSISTANT
Payer: COMMERCIAL

## 2023-07-25 DIAGNOSIS — Z12.31 SCREENING MAMMOGRAM FOR BREAST CANCER: ICD-10-CM

## 2023-07-25 PROCEDURE — 77067 SCR MAMMO BI INCL CAD: CPT | Performed by: PHYSICIAN ASSISTANT

## 2023-07-25 PROCEDURE — 77063 BREAST TOMOSYNTHESIS BI: CPT | Performed by: PHYSICIAN ASSISTANT

## 2023-10-10 ENCOUNTER — HOSPITAL ENCOUNTER (OUTPATIENT)
Age: 58
Discharge: HOME | End: 2023-10-10
Payer: COMMERCIAL

## 2023-10-10 VITALS
OXYGEN SATURATION: 100 % | TEMPERATURE: 98 F | SYSTOLIC BLOOD PRESSURE: 126 MMHG | DIASTOLIC BLOOD PRESSURE: 67 MMHG | HEART RATE: 58 BPM | WEIGHT: 164.88 LBS | RESPIRATION RATE: 20 BRPM | BODY MASS INDEX: 27 KG/M2

## 2023-10-10 DIAGNOSIS — R39.15 URINARY URGENCY: Primary | ICD-10-CM

## 2023-10-10 DIAGNOSIS — R31.9 HEMATURIA, UNSPECIFIED TYPE: ICD-10-CM

## 2023-10-10 LAB
BILIRUB UR QL STRIP: NEGATIVE
CLARITY UR: CLEAR
COLOR UR: YELLOW
GLUCOSE UR STRIP-MCNC: NEGATIVE MG/DL
KETONES UR STRIP-MCNC: NEGATIVE MG/DL
LEUKOCYTE ESTERASE UR QL STRIP: NEGATIVE
NITRITE UR QL STRIP: NEGATIVE
PH UR STRIP: 6 [PH]
PROT UR STRIP-MCNC: NEGATIVE MG/DL
SP GR UR STRIP: <=1.005
UROBILINOGEN UR STRIP-ACNC: <2 MG/DL

## 2023-10-10 PROCEDURE — 87086 URINE CULTURE/COLONY COUNT: CPT | Performed by: NURSE PRACTITIONER

## 2023-10-10 PROCEDURE — 99213 OFFICE O/P EST LOW 20 MIN: CPT | Performed by: NURSE PRACTITIONER

## 2023-10-10 PROCEDURE — 81002 URINALYSIS NONAUTO W/O SCOPE: CPT | Performed by: NURSE PRACTITIONER

## 2023-10-10 RX ORDER — CEPHALEXIN 500 MG/1
500 CAPSULE ORAL 2 TIMES DAILY
Qty: 10 CAPSULE | Refills: 0 | Status: SHIPPED | OUTPATIENT
Start: 2023-10-10 | End: 2023-10-15

## 2023-10-11 NOTE — DISCHARGE INSTRUCTIONS
Take antibiotics as directed and to completion (or until culture results are complete)  Drink plenty of water  Avoid sugary drinks (e.g. Soda, juice, etc.)  Follow up with your doctor as needed

## 2024-06-24 ENCOUNTER — TELEPHONE (OUTPATIENT)
Dept: FAMILY MEDICINE CLINIC | Facility: CLINIC | Age: 59
End: 2024-06-24

## 2024-06-24 DIAGNOSIS — Z12.31 SCREENING MAMMOGRAM FOR BREAST CANCER: Primary | ICD-10-CM

## 2024-08-07 ENCOUNTER — HOSPITAL ENCOUNTER (OUTPATIENT)
Dept: MAMMOGRAPHY | Age: 59
Discharge: HOME | End: 2024-08-07
Attending: FAMILY MEDICINE
Payer: COMMERCIAL

## 2024-08-07 DIAGNOSIS — Z12.31 SCREENING MAMMOGRAM FOR BREAST CANCER: ICD-10-CM

## 2024-08-07 PROCEDURE — 77067 SCR MAMMO BI INCL CAD: CPT | Performed by: FAMILY MEDICINE

## 2024-08-07 PROCEDURE — 77063 BREAST TOMOSYNTHESIS BI: CPT | Performed by: FAMILY MEDICINE

## 2024-08-16 ENCOUNTER — TELEPHONE (OUTPATIENT)
Dept: FAMILY MEDICINE CLINIC | Facility: CLINIC | Age: 59
End: 2024-08-16

## 2024-08-16 DIAGNOSIS — Z00.00 ROUTINE HEALTH MAINTENANCE: Primary | ICD-10-CM

## 2024-08-16 NOTE — TELEPHONE ENCOUNTER
Please enter lab orders for the patient's upcoming physical appointment.     Physical scheduled:   Your appointments       Date & Time Appointment Department ([place])    Oct 17, 2024 9:40 AM CDT Physical - Established with Layla Frias DO [facility] ([facility])              [facility]  [address] [place] [address] [place] [id number]  [phone]           Preferred lab: [facility] ([facility])     The patient has been notified to complete fasting labs prior to their physical appointment.

## 2024-09-30 ENCOUNTER — TELEPHONE (OUTPATIENT)
Dept: FAMILY MEDICINE CLINIC | Facility: CLINIC | Age: 59
End: 2024-09-30

## 2024-09-30 NOTE — TELEPHONE ENCOUNTER
Patient call requesting labs orders to be send  it to [facility].  Patient was at [facility] this morning.  I print the labs orders and send it to [facility]   Fax # [phone]

## 2024-10-01 LAB
ABSOLUTE BASOPHILS: 31 CELLS/UL (ref 0–200)
ABSOLUTE EOSINOPHILS: 101 CELLS/UL (ref 15–500)
ABSOLUTE LYMPHOCYTES: 1866 CELLS/UL (ref 850–3900)
ABSOLUTE MONOCYTES: 370 CELLS/UL (ref 200–950)
ABSOLUTE NEUTROPHILS: 2033 CELLS/UL (ref 1500–7800)
ALBUMIN/GLOBULIN RATIO: 1.6 (CALC) (ref 1–2.5)
ALBUMIN: 4.1 G/DL (ref 3.6–5.1)
ALKALINE PHOSPHATASE: 59 U/L (ref 37–153)
ALT: 15 U/L (ref 6–29)
AST: 23 U/L (ref 10–35)
BASOPHILS: 0.7 %
BILIRUBIN, TOTAL: 0.5 MG/DL (ref 0.2–1.2)
BUN: 15 MG/DL (ref 7–25)
CALCIUM: 9.5 MG/DL (ref 8.6–10.4)
CARBON DIOXIDE: 26 MMOL/L (ref 20–32)
CHLORIDE: 106 MMOL/L (ref 98–110)
CHOL/HDLC RATIO: 2.7 (CALC)
CHOLESTEROL, TOTAL: 224 MG/DL
CREATININE: 0.8 MG/DL (ref 0.5–1.03)
EGFR: 86 ML/MIN/1.73M2
EOSINOPHILS: 2.3 %
GLOBULIN: 2.6 G/DL (CALC) (ref 1.9–3.7)
GLUCOSE: 87 MG/DL (ref 65–99)
HDL CHOLESTEROL: 82 MG/DL
HEMATOCRIT: 43.1 % (ref 35–45)
HEMOGLOBIN A1C: 5.4 % OF TOTAL HGB
HEMOGLOBIN: 13.9 G/DL (ref 11.7–15.5)
LDL-CHOLESTEROL: 125 MG/DL (CALC)
LYMPHOCYTES: 42.4 %
MCH: 30.3 PG (ref 27–33)
MCHC: 32.3 G/DL (ref 32–36)
MCV: 94.1 FL (ref 80–100)
MONOCYTES: 8.4 %
MPV: 10.3 FL (ref 7.5–12.5)
NEUTROPHILS: 46.2 %
NON-HDL CHOLESTEROL: 142 MG/DL (CALC)
PLATELET COUNT: 263 THOUSAND/UL (ref 140–400)
POTASSIUM: 4.5 MMOL/L (ref 3.5–5.3)
PROTEIN, TOTAL: 6.7 G/DL (ref 6.1–8.1)
RDW: 12.5 % (ref 11–15)
RED BLOOD CELL COUNT: 4.58 MILLION/UL (ref 3.8–5.1)
SODIUM: 140 MMOL/L (ref 135–146)
TRIGLYCERIDES: 75 MG/DL
TSH W/REFLEX TO FT4: 1.5 MIU/L (ref 0.4–4.5)
WHITE BLOOD CELL COUNT: 4.4 THOUSAND/UL (ref 3.8–10.8)

## 2024-10-17 ENCOUNTER — OFFICE VISIT (OUTPATIENT)
Dept: FAMILY MEDICINE CLINIC | Facility: CLINIC | Age: 59
End: 2024-10-17
Payer: COMMERCIAL

## 2024-10-17 VITALS
WEIGHT: 161.5 LBS | HEIGHT: 66 IN | SYSTOLIC BLOOD PRESSURE: 100 MMHG | BODY MASS INDEX: 25.96 KG/M2 | DIASTOLIC BLOOD PRESSURE: 62 MMHG | RESPIRATION RATE: 16 BRPM

## 2024-10-17 DIAGNOSIS — E03.9 ACQUIRED HYPOTHYROIDISM: ICD-10-CM

## 2024-10-17 DIAGNOSIS — Z00.00 WELL WOMAN EXAM WITHOUT GYNECOLOGICAL EXAM: Primary | ICD-10-CM

## 2024-10-17 DIAGNOSIS — Z23 NEED FOR VACCINATION: ICD-10-CM

## 2024-10-17 DIAGNOSIS — N95.1 MENOPAUSAL SYMPTOMS: ICD-10-CM

## 2024-10-17 RX ORDER — ESTRADIOL 0.04 MG/D
PATCH, EXTENDED RELEASE TRANSDERMAL
COMMUNITY
Start: 2024-02-27

## 2024-10-17 RX ORDER — PROGESTERONE 100 MG/1
100 CAPSULE ORAL AS DIRECTED
COMMUNITY
Start: 2024-03-03

## 2024-10-17 RX ORDER — LEVOTHYROXINE SODIUM 112 UG/1
112 TABLET ORAL
COMMUNITY
Start: 2024-10-15

## 2024-10-17 NOTE — PROGRESS NOTES
SUBJECTIVE:  Chief Complaint   Patient presents with    Well Adult     Patient here for physical      HPI:  Changed diet, exercising regularly. Cholesterol is mildly elevated.    In menopause. Saw gyne for discussion of HRT. No significant menopausal symptoms. Sleep was one of the bigger issues. Also notes an achilles tendonitis. Had period after first month on 0.375mg patch and 100mg progesterone. The next month had a heavier period. Then developed breast tenderness. The last 3 months continued to have her period. Breast tenderness resolved. Had pelvic US which was normal.     Health Maintenance:  Vaccines: reviewed as below. Indicated today: Tdap, influenza  Immunization History   Administered Date(s) Administered    Covid-19 Vaccine Pfizer 30 mcg/0.3 ml 02/03/2021, 02/24/2021, 10/06/2021    Covid-19 Vaccine Pfizer Bivalent 30mcg/0.3mL 09/17/2022    FLULAVAL 6 months & older 0.5 ml Prefilled syringe (00389) 10/10/2017, 10/08/2019    FLUZONE 6 months and older PFS 0.5 ml (38682) 12/14/2016, 10/10/2017    TDAP 12/06/2013, 10/17/2024    Tb Intradermal Test 10/10/2017    Zoster Vaccine Recombinant Adjuvanted (Shingrix) 08/07/2020, 11/06/2020     Obesity screening: Body mass index is 26.07 kg/m².  Diabetes screening:  negative  Hypercholesterolemia screening:   Lab Results   Component Value Date    HDL 82 09/30/2024    HDL 86 10/13/2022    HDL 75 10/03/2019     Lab Results   Component Value Date     (H) 09/30/2024     (H) 10/13/2022     (H) 10/03/2019     Lab Results   Component Value Date    TRIG 75 09/30/2024    TRIG 59 10/13/2022    TRIG 55 10/03/2019        Depression screen: no concerns  Osteoporosis: No history of pathologic fractures. No steroid use, smoking, risk of falls, excessive alcohol use.  Cervical Cancer screening: Last Pap: 10/2022     Colon Cancer screening:  Last colonoscopy: 2017 (10 years)  Breast Cancer screening:  Last mammogram: 8/2024  STI: Desires testing for STIs?  no  Tobacco use:  reports that she has never smoked. She has never used smokeless tobacco.    ROS: Negative unless stated above    HISTORY:  Past Medical History:    Unspecified disorder of thyroid      Past Surgical History:   Procedure Laterality Date          Tonsillectomy        Family History   Problem Relation Age of Onset    Hypertension Mother     Thyroid Disorder Father       Social History     Socioeconomic History    Marital status:    Tobacco Use    Smoking status: Never    Smokeless tobacco: Never   Vaping Use    Vaping status: Never Used   Substance and Sexual Activity    Alcohol use: Yes     Alcohol/week: 1.0 standard drink of alcohol     Types: 1 Standard drinks or equivalent per week    Drug use: No   Other Topics Concern    Caffeine Concern Yes     Comment: 1 cup coffee daily    Exercise Yes     Comment: daily- walks 9 miles daily, 0.5 hr on eliptical daily    Seat Belt Yes    Self-Exams No        Allergies:  Allergies[1]    OBJECTIVE:  PHYSICAL EXAM:  Vitals:    10/17/24 1000   BP: 100/62   Resp: 16   Weight: 161 lb 8 oz (73.3 kg)   Height: 5' 6\" (1.676 m)     Physical Examination: General appearance - alert, well appearing, and in no distress and normal appearing weight  Mental status - alert, oriented to person, place, and time, normal mood, behavior, speech, dress, motor activity, and thought processes  Ears - bilateral TM's and external ear canals normal  Neck - supple, no significant adenopathy  Chest - clear to auscultation, no wheezes, rales or rhonchi, symmetric air entry  Heart - normal rate, regular rhythm, normal S1, S2, no murmurs, rubs, clicks or gallops  Abdomen - soft, nontender, nondistended, no masses or organomegaly  Extremities - peripheral pulses normal, no pedal edema, no clubbing or cyanosis    ASSESSMENT & PLAN:  Damari Talbot is a 59 year old female is here for Well Adult (Patient here for physical )    Problem List Items Addressed This Visit           Endocrine and Metabolic    Acquired hypothyroidism    Relevant Medications    UNITHROID 112 MCG Oral Tab     Other Visit Diagnoses       Well woman exam without gynecological exam    -  Primary    Relevant Medications    Estradiol 0.0375 MG/24HR Transdermal Patch Biweekly    progesterone 100 MG Oral Cap    Need for vaccination        Relevant Orders    Immunization Admin Counseling, 1st Component, 18 years and older    TdaP (Boostrix/Adacel) Vaccine (> 7 Y) (Completed)    Menopausal symptoms        Relevant Medications    Estradiol 0.0375 MG/24HR Transdermal Patch Biweekly    progesterone 100 MG Oral Cap            Sly was seen today for well adult.    Diagnoses and all orders for this visit:    Well woman exam without gynecological exam  Routine health maintenance reviewed, discussed and updated as below. This includes: Vaccines. Healthy diet and exercise reviewed.     Need for vaccination  -     Immunization Admin Counseling, 1st Component, 18 years and older  -     TdaP (Boostrix/Adacel) Vaccine (> 7 Y)    Acquired hypothyroidism  Controlled.  Continue levothyroxine    Menopausal symptoms  Controlled on HRT.  Now with postmenopausal bleeding.  Ultrasound negative.  Should follow-up with EMB to complete workup.  Will defer to gynecology.  Did discuss that increasing progesterone may decrease risk of continuing to have a period.  Patient will consider discussing with gynecology.    Call or return to clinic prn if these symptoms worsen or fail to improve as anticipated. RTC in 1 year.   Layla Frias DO  10/17/2024 10:18 AM    Note to Patient  The 21st Century Cures Act makes medical notes like these available to patients in the interest of transparency. However, be advised this is a medical document and is intended as [id number] communication; it is written in medical language and may appear blunt, direct, or contain abbreviations or verbiage that are unfamiliar. Medical documents are intended to carry  relevant information, facts as evident, and the clinical opinion of the practitioner.     This report has been produced using speech recognition software, and may contain errors related to grammar, punctuation, spelling, words or phrases unrecognized or not translated appropriately to text; these errors may be referred to the dictating provider for further clarification and/or addendum as needed.         [1] No Known Allergies

## 2024-11-27 NOTE — PROGRESS NOTES
Patient is in the supine position.   The body was positioned using the following devices: gel pad mattress and blanket.  The left arm was positioned using the following devices: arm board.  The right arm was positioned using the following devices: arm board.  The patient was positioned by Natalee Crocker RN, Carolina Lamb RN, Marychuy Alvarez Patient presents with:  Bite Sting,Insect (integumentary): left inner arm       HPI:  Presents with approx 2 day history of redness and swelling to left inner upper arm that started after sting.  Patient is unsure if it was bee or wasp but did see stinger l drainage noted. Area is soft and non-TTP. A/P:    Insect stings, accidental or unintentional, initial encounter  (primary encounter diagnosis)- Likely just localized reaction. Prednisone for 4 days. OTC allergy medicine daily for 1 week.  Instructed t

## 2025-03-19 ENCOUNTER — TELEPHONE (OUTPATIENT)
Dept: FAMILY MEDICINE CLINIC | Facility: CLINIC | Age: 60
End: 2025-03-19

## 2025-03-19 NOTE — TELEPHONE ENCOUNTER
Spoke with patient, all information given. Recommended based on birth year to have MMR vaccine done. Patient verbalized understanding, will check with her pharmacy to see if they have the vaccine. No further concerns.

## 2025-03-19 NOTE — TELEPHONE ENCOUNTER
Patient would like to speak to nursing staff regarding measle vaccination.  Does she need a titer? Based off her age.    Please advise.

## 2025-04-22 PROBLEM — K21.9 GASTROESOPHAGEAL REFLUX DISEASE: Status: ACTIVE | Noted: 2025-04-22

## 2025-04-22 PROBLEM — R11.0 NAUSEA: Status: ACTIVE | Noted: 2025-04-22

## 2025-04-22 PROBLEM — R07.9 CHEST PAIN, UNSPECIFIED: Status: ACTIVE | Noted: 2025-04-22

## 2025-05-19 ENCOUNTER — OFFICE VISIT (OUTPATIENT)
Dept: FAMILY MEDICINE CLINIC | Facility: CLINIC | Age: 60
End: 2025-05-19
Payer: COMMERCIAL

## 2025-05-19 VITALS
DIASTOLIC BLOOD PRESSURE: 74 MMHG | BODY MASS INDEX: 24.96 KG/M2 | SYSTOLIC BLOOD PRESSURE: 120 MMHG | OXYGEN SATURATION: 99 % | RESPIRATION RATE: 18 BRPM | WEIGHT: 159 LBS | HEART RATE: 67 BPM | HEIGHT: 67 IN

## 2025-05-19 DIAGNOSIS — M79.671 PAIN OF BOTH HEELS: ICD-10-CM

## 2025-05-19 DIAGNOSIS — M76.60 ACHILLES TENDON PAIN: Primary | ICD-10-CM

## 2025-05-19 DIAGNOSIS — M79.672 PAIN OF BOTH HEELS: ICD-10-CM

## 2025-05-19 PROBLEM — R11.0 NAUSEA: Status: RESOLVED | Noted: 2025-04-22 | Resolved: 2025-05-19

## 2025-05-19 PROBLEM — R07.9 CHEST PAIN, UNSPECIFIED: Status: RESOLVED | Noted: 2025-04-22 | Resolved: 2025-05-19

## 2025-05-19 PROCEDURE — 99213 OFFICE O/P EST LOW 20 MIN: CPT | Performed by: NURSE PRACTITIONER

## 2025-05-19 PROCEDURE — 3074F SYST BP LT 130 MM HG: CPT | Performed by: NURSE PRACTITIONER

## 2025-05-19 PROCEDURE — 3008F BODY MASS INDEX DOCD: CPT | Performed by: NURSE PRACTITIONER

## 2025-05-19 PROCEDURE — 3078F DIAST BP <80 MM HG: CPT | Performed by: NURSE PRACTITIONER

## 2025-05-19 NOTE — PROGRESS NOTES
Chief Complaint   Patient presents with    Foot Pain     Per pt has been having pain on both feet for 3 years, pain is worse when walking      HPI:  Presents with approx 3 year history of pain to bilateral Achilles areas, worse with walking or prolonged standing. Denies injury to area. Denies redness or swelling of areas. Has tried many different treatments for this over the years including physical therapy several times, needling, shock wave, chiropractor visits and even tried HRT. Has not had significant relief from any of these methods. Was prescribed diclofenac by podiatrists which provides some temporary relief. Would like to see rheumatology, as has heard that medication such as plaquenil can help those with symptoms similar to hers.     Past Medical History[1]    Problem List[2]    Current Medications[3]    Physical Exam  /74   Pulse 67   Resp 18   Ht 5' 7\" (1.702 m)   Wt 159 lb (72.1 kg)   SpO2 99%   BMI 24.90 kg/m²   Constitutional: well developed, well nourished, in no apparent distress  HEENT: Normocephalic and atraumatic.   Cardiovascular: Normal rate.   Pulmonary/Chest: No respiratory distress. Effort normal.   Ext:  Bilateral posterior heel/Achilles area w/o edema, erythema, deformity, significant TTP, limited ROM.  Skin: Skin is warm and dry. No pallor. No rash noted.    A/P:    Encounter Diagnoses   Name Primary?    Achilles tendon pain- check labs as ordered. Referred to rheumatology as requested. My continue PRN diclofenac for this for now. Medication administration, use and side effects discussed.    Yes    Pain of both heels- as above        Orders Placed This Encounter   Procedures    [facility]choice CASCADING  REFLEX [19946] [Q]    RHEUMATOID ARTHRITIS FACTOR [3578] [Q]       Meds & Refills for this Visit:  Requested Prescriptions     Signed Prescriptions Disp Refills    diclofenac 50 MG Oral Tab EC 30 tablet 1     Sig: Take 1 tablet (50 mg total) by mouth 2 (two) times daily as needed.        Imaging & Consults:  RHEUMATOLOGY - INTERNAL    No follow-ups on file.  There are no Patient Instructions on file for this visit.    All questions were answered and the patient understands the plan.          [1]   Past Medical History:   Unspecified disorder of thyroid   [2]   Patient Active Problem List  Diagnosis    Acquired hypothyroidism    Palpitations    Pain in joint, site unspecified    Polycystic ovaries    Plantar fascial fibromatosis    Vitamin D deficiency    Hashimoto's thyroiditis    Marcum metatarsalgia, right    Gastroesophageal reflux disease   [3]   Current Outpatient Medications   Medication Sig Dispense Refill    diclofenac 50 MG Oral Tab EC Take 1 tablet (50 mg total) by mouth 2 (two) times daily as needed. 30 tablet 1    pantoprazole 40 MG Oral Tab EC Take one tablet (40 mg total) by mouth once daily, 30 minutes prior to breakfast. 30 tablet 0    UNITHROID 100 MCG Oral Tab Take 1 tablet (100 mcg total) by mouth before breakfast. 90 tablet 3

## 2025-05-20 ENCOUNTER — TELEPHONE (OUTPATIENT)
Dept: FAMILY MEDICINE CLINIC | Facility: CLINIC | Age: 60
End: 2025-05-20

## 2025-05-20 DIAGNOSIS — M79.671 PAIN OF BOTH HEELS: ICD-10-CM

## 2025-05-20 DIAGNOSIS — M79.672 PAIN OF BOTH HEELS: ICD-10-CM

## 2025-05-20 DIAGNOSIS — M76.60 ACHILLES TENDON PAIN: ICD-10-CM

## 2025-05-20 NOTE — TELEPHONE ENCOUNTER
Spoke with pt. Pt requesting lab orders be faxed to [facility] at [phone]. Lab orders faxed.

## 2025-06-04 ENCOUNTER — TELEPHONE (OUTPATIENT)
Dept: FAMILY MEDICINE CLINIC | Facility: CLINIC | Age: 60
End: 2025-06-04

## 2025-06-04 LAB
ANA SCREEN: NEGATIVE
CYCLIC CITRULLINATED$PEPTIDE (CCP) AB (IGG): <16
MUTATED CITRULLINATED VIMENTIN (MCV) AB: <20
RHEUMATOID FACTOR: <10

## 2025-06-04 NOTE — TELEPHONE ENCOUNTER
Received results from [facility] for PETAR screening, Rheumatoid factor, CCP and mutated citrullinated vimentin, all of which are normal. Please abstract and scan. Notify patient that results were normal. Thanks.

## 2025-07-08 ENCOUNTER — TELEPHONE (OUTPATIENT)
Dept: FAMILY MEDICINE CLINIC | Facility: CLINIC | Age: 60
End: 2025-07-08

## 2025-07-08 DIAGNOSIS — Z12.31 SCREENING MAMMOGRAM FOR BREAST CANCER: Primary | ICD-10-CM

## 2025-07-08 NOTE — TELEPHONE ENCOUNTER
Patient is requesting an order for her annual screening mammogram.    Patient has been notified to allow 2-3 business days for order placement. Reviewed with patient that she may schedule mammogram via [employer] or by calling Central Scheduling at that time.    Request for mammogram order routed to triage.

## 2025-07-08 NOTE — TELEPHONE ENCOUNTER
ROUTINE MAMMOGRAM AND CLINICAL EVALUATION IN 12 MONTHS.   per mammogram on 8/07/24   Order placed

## 2025-07-25 ENCOUNTER — TELEPHONE (OUTPATIENT)
Dept: FAMILY MEDICINE CLINIC | Facility: CLINIC | Age: 60
End: 2025-07-25

## 2025-07-25 DIAGNOSIS — Z00.00 ROUTINE HEALTH MAINTENANCE: Primary | ICD-10-CM

## 2025-08-12 ENCOUNTER — LAB ENCOUNTER (OUTPATIENT)
Dept: LAB | Age: 60
End: 2025-08-12
Attending: FAMILY MEDICINE

## 2025-08-12 LAB
ALBUMIN SERPL-MCNC: 4.4 G/DL (ref 3.2–4.8)
ALBUMIN/GLOB SERPL: 1.5 (ref 1–2)
ALP LIVER SERPL-CCNC: 71 U/L (ref 46–118)
ALT SERPL-CCNC: 17 U/L (ref 10–49)
ANION GAP SERPL CALC-SCNC: 11 MMOL/L (ref 0–18)
AST SERPL-CCNC: 21 U/L (ref ?–34)
BASOPHILS # BLD AUTO: 0.05 X10(3) UL (ref 0–0.2)
BASOPHILS NFR BLD AUTO: 1 %
BILIRUB SERPL-MCNC: 0.4 MG/DL (ref 0.3–1.2)
BUN BLD-MCNC: 15 MG/DL (ref 9–23)
CALCIUM BLD-MCNC: 9.7 MG/DL (ref 8.7–10.6)
CHLORIDE SERPL-SCNC: 105 MMOL/L (ref 98–112)
CHOLEST SERPL-MCNC: 209 MG/DL (ref ?–200)
CO2 SERPL-SCNC: 29 MMOL/L (ref 21–32)
CREAT BLD-MCNC: 0.92 MG/DL (ref 0.55–1.02)
EGFRCR SERPLBLD CKD-EPI 2021: 72 ML/MIN/1.73M2 (ref 60–?)
EOSINOPHIL # BLD AUTO: 0.07 X10(3) UL (ref 0–0.7)
EOSINOPHIL NFR BLD AUTO: 1.4 %
ERYTHROCYTE [DISTWIDTH] IN BLOOD BY AUTOMATED COUNT: 13.2 %
EST. AVERAGE GLUCOSE BLD GHB EST-MCNC: 111 MG/DL (ref 68–126)
FASTING PATIENT LIPID ANSWER: YES
FASTING STATUS PATIENT QL REPORTED: YES
GLOBULIN PLAS-MCNC: 2.9 G/DL (ref 2–3.5)
GLUCOSE BLD-MCNC: 81 MG/DL (ref 70–99)
HBA1C MFR BLD: 5.5 % (ref ?–5.7)
HCT VFR BLD AUTO: 38.6 % (ref 35–48)
HDLC SERPL-MCNC: 66 MG/DL (ref 40–59)
HGB BLD-MCNC: 12.9 G/DL (ref 12–16)
IMM GRANULOCYTES # BLD AUTO: 0.07 X10(3) UL (ref 0–1)
IMM GRANULOCYTES NFR BLD: 1.4 %
LDLC SERPL CALC-MCNC: 131 MG/DL (ref ?–100)
LYMPHOCYTES # BLD AUTO: 2.02 X10(3) UL (ref 1–4)
LYMPHOCYTES NFR BLD AUTO: 40.5 %
MCH RBC QN AUTO: 29.7 PG (ref 26–34)
MCHC RBC AUTO-ENTMCNC: 33.4 G/DL (ref 31–37)
MCV RBC AUTO: 88.7 FL (ref 80–100)
MONOCYTES # BLD AUTO: 0.34 X10(3) UL (ref 0.1–1)
MONOCYTES NFR BLD AUTO: 6.8 %
NEUTROPHILS # BLD AUTO: 2.44 X10 (3) UL (ref 1.5–7.7)
NEUTROPHILS # BLD AUTO: 2.44 X10(3) UL (ref 1.5–7.7)
NEUTROPHILS NFR BLD AUTO: 48.9 %
NONHDLC SERPL-MCNC: 143 MG/DL (ref ?–130)
OSMOLALITY SERPL CALC.SUM OF ELEC: 300 MOSM/KG (ref 275–295)
PLATELET # BLD AUTO: 328 10(3)UL (ref 150–450)
POTASSIUM SERPL-SCNC: 4.1 MMOL/L (ref 3.5–5.1)
PROT SERPL-MCNC: 7.3 G/DL (ref 5.7–8.2)
RBC # BLD AUTO: 4.35 X10(6)UL (ref 3.8–5.3)
SODIUM SERPL-SCNC: 145 MMOL/L (ref 136–145)
TRIGL SERPL-MCNC: 68 MG/DL (ref 30–149)
TSI SER-ACNC: 0.62 UIU/ML (ref 0.55–4.78)
VLDLC SERPL CALC-MCNC: 12 MG/DL (ref 0–30)
WBC # BLD AUTO: 5 X10(3) UL (ref 4–11)

## 2025-08-12 PROCEDURE — 83036 HEMOGLOBIN GLYCOSYLATED A1C: CPT | Performed by: FAMILY MEDICINE

## 2025-08-12 PROCEDURE — 80061 LIPID PANEL: CPT | Performed by: FAMILY MEDICINE

## 2025-08-12 PROCEDURE — 80050 GENERAL HEALTH PANEL: CPT | Performed by: FAMILY MEDICINE

## 2025-08-15 ENCOUNTER — OFFICE VISIT (OUTPATIENT)
Dept: FAMILY MEDICINE CLINIC | Facility: CLINIC | Age: 60
End: 2025-08-15

## 2025-08-15 VITALS
SYSTOLIC BLOOD PRESSURE: 102 MMHG | DIASTOLIC BLOOD PRESSURE: 60 MMHG | BODY MASS INDEX: 25.25 KG/M2 | HEIGHT: 66.54 IN | HEART RATE: 58 BPM | OXYGEN SATURATION: 96 % | WEIGHT: 159 LBS | RESPIRATION RATE: 16 BRPM

## 2025-08-15 DIAGNOSIS — R40.0 DAYTIME SOMNOLENCE: ICD-10-CM

## 2025-08-15 DIAGNOSIS — M54.12 CERVICAL RADICULOPATHY: ICD-10-CM

## 2025-08-15 DIAGNOSIS — Z12.4 SCREENING FOR CERVICAL CANCER: ICD-10-CM

## 2025-08-15 DIAGNOSIS — R51.9 MORNING HEADACHE: ICD-10-CM

## 2025-08-15 DIAGNOSIS — R06.83 SNORING: ICD-10-CM

## 2025-08-15 DIAGNOSIS — Z01.419 WELL WOMAN EXAM WITH ROUTINE GYNECOLOGICAL EXAM: Primary | ICD-10-CM

## 2025-08-15 DIAGNOSIS — M76.60 ACHILLES TENDON PAIN: ICD-10-CM

## 2025-08-15 PROBLEM — I83.893 VARICOSE VEINS OF BILATERAL LOWER EXTREMITIES WITH OTHER COMPLICATIONS: Status: ACTIVE | Noted: 2025-08-15

## 2025-08-15 PROBLEM — M24.572 CONTRACTURE OF JOINT OF ANKLE AND FOOT, LEFT: Status: ACTIVE | Noted: 2024-11-06

## 2025-08-15 PROBLEM — M24.575 CONTRACTURE OF JOINT OF ANKLE AND FOOT, LEFT: Status: ACTIVE | Noted: 2024-11-06

## 2025-08-15 PROCEDURE — 87624 HPV HI-RISK TYP POOLED RSLT: CPT | Performed by: FAMILY MEDICINE

## 2025-08-15 PROCEDURE — 88175 CYTOPATH C/V AUTO FLUID REDO: CPT | Performed by: FAMILY MEDICINE

## 2025-08-15 RX ORDER — METHYLPREDNISOLONE 4 MG/1
TABLET ORAL
Qty: 21 EACH | Refills: 0 | Status: SHIPPED | OUTPATIENT
Start: 2025-08-15

## 2025-08-18 LAB — HPV E6+E7 MRNA CVX QL NAA+PROBE: NEGATIVE

## 2025-08-21 ENCOUNTER — HOSPITAL ENCOUNTER (OUTPATIENT)
Dept: MAMMOGRAPHY | Age: 60
Discharge: HOME | End: 2025-08-21
Attending: FAMILY MEDICINE

## 2025-08-21 DIAGNOSIS — Z12.31 SCREENING MAMMOGRAM FOR BREAST CANCER: ICD-10-CM

## 2025-08-21 PROCEDURE — 77067 SCR MAMMO BI INCL CAD: CPT | Performed by: FAMILY MEDICINE

## 2025-08-21 PROCEDURE — 77063 BREAST TOMOSYNTHESIS BI: CPT | Performed by: FAMILY MEDICINE
